# Patient Record
(demographics unavailable — no encounter records)

---

## 2025-01-27 NOTE — END OF VISIT
[] : Resident [FreeTextEntry3] : Agree with resident A/P. Reviewed perimenopause s/s, treatments. Using patient centered shared decision making and pap smear not indicated 2022 NILM, HPV neg, and no complaints deferred pelvic exam. Recent mammogram with biopsy therefore, breast exam deferred. Colonoscopy UTD. Would like STI testing.

## 2025-01-27 NOTE — HISTORY OF PRESENT ILLNESS
[Patient reported mammogram was normal] : Patient reported mammogram was normal [Patient reported PAP Smear was normal] : Patient reported PAP Smear was normal [Y] : Patient is sexually active [N] : Patient denies prior pregnancies [Normal Amount/Duration] :  normal amount and duration [Regular Cycle Intervals] : periods have been regular [Menarche Age: ____] : age at menarche was [unfilled] [Currently Active] : currently active [Both] : men and women [Vaginal] : vaginal [Oral] : oral [Mammogramdate] : 1/5/2025 [PapSmeardate] : 10/7/2022 [LMPDate] : 1/21/2025 [MensesFreq] : 28 [MensesLength] : 6-7 [FreeTextEntry1] : 1/21/2025

## 2025-01-27 NOTE — PHYSICAL EXAM
[Appropriately responsive] : appropriately responsive [Alert] : alert [No Acute Distress] : no acute distress [Oriented x3] : oriented x3 [FreeTextEntry2] : Shared physician patient decision making on not to perform pelvic exam at this time as pt has no vaginal complaints and not indicated to perfom pap smear today

## 2025-01-27 NOTE — COUNSELING
[Nutrition/ Exercise/ Weight Management] : nutrition, exercise, weight management [Vitamins/Supplements] : vitamins/supplements [Breast Self Exam] : breast self exam [STD (testing, results, tx)] : STD (testing, results, tx) [Lab Results] : lab results [Medication Management] : medication management

## 2025-01-27 NOTE — DISCUSSION/SUMMARY
[FreeTextEntry1] : 44yo G0 presents for annual exam  - Pt endorsing minimal perimenopausal symptoms that are not effecting her daily life at this time. Pt counseled on HRT and does not meet criteria for starting HRT at this time.  - Pt counseled on PAP smear with cotesting and no need for pap smear at this time - Pt requesting STI testing due to sexual activity with multiple partners. F/u results - Pt currently asymptomatic and had no itching, dysuria, or abnormal discharge. no need for vaginitis at this time due to hx of BV in past. Pt counseled and stated understanding - Prescribed  4mg zofran PRN for monthly nausea around menses - Pt endorsing NOE, given referral to Dr. Cramer for further managment seen and d/w Dr. Jones DC PGY4

## 2025-04-24 NOTE — PHYSICAL EXAM
[MA] : MA [FreeTextEntry2] : sammy [No Acute Distress] : in no acute distress [Well developed] : well developed [Well Nourished] : ~L well nourished [Good Hygeine] : demonstrates good hygeine [Oriented x3] : oriented to person, place, and time [Normal Memory] : ~T memory was ~L unimpaired [Normal Mood/Affect] : mood and affect are normal [Anxiety] : patient is not anxious [Normal Lung Sounds] : the lungs were clear to auscultation [Respirations regular] : ~T respiratory rate was regular [Cough] : no cough [Dyspnea] : no dyspnea [Rate & Rhythm Regular] : ~T heart rate and rhythm were normal [No Edema] : ~T edema was not present [Mass (___ Cm)] : no ~M [unfilled] abdominal mass was palpated [Tenderness] : ~T no ~M abdominal tenderness observed [H/Smegaly] : no hepatosplenomegaly [Hernia] : no hernia observed [Supraclavicular LAD] : no adenopathy noted in supraclavicular lymph nodes [Axillary LAD] : no adenopathy was noted in axillary nodes [Inguinal LAD] : no adenopathy was noted in the inguinal lymph nodes [Warm and Dry] : was warm and dry to touch [Turgor Normal] : skin turgor ~T was normal [Rash/Lesion] : no rash or lesion was noted [Normal Gait] : gait was normal [No Joint Swelling] : there was no joint swelling [No Clubbing, Cyanosis] : no clubbing or cyanosis of the fingernails [Normal Strength/Tone] : muscle strength and tone were normal [Normal Appearance] : general appearance was normal [Atrophy] : atrophy [Uterine Adnexae] : were not tender and not enlarged [Post Void Residual ____ml] : post void residual was [unfilled] ml [Normal rectal exam] : was normal [Normal] : was normal [None] : no [de-identified] : cst neg

## 2025-04-24 NOTE — ASSESSMENT
[FreeTextEntry1] : 46 y  G0\ salomón  with  sneeze and oab bothersome wears pads x 2 We discussed possible etiologies of her symptoms including overactive bladder. I recommend she start behavioral modifications and bladder training. Written instructions on how to perform bladder training were provided.  She will try this for 4-6 weeks. We reviewed medications for overactive bladder.  If she has no significant improvement in her symptoms she will try adding an anticholinergic medication. Risks and side effects reviewed extensively. She will RTO in 10-12 weeks for follow up or sooner if issues arise.  If she has no improvement in symptoms, will proceed with further workup including urodynamic testing and cystoscopy.  We reviewed management options for stress urinary incontinence including: observation, pelvic floor exercises, continence devices, periurethral bulking agents,  and surgical management. We discussed surgical management options and written information on stress urinary incontinence including management options from [facility] was provided to her and reviewed. We reviewed risks and benefits of each procedure. Specifically, we discussed the risks and benefits of mesh and reviewed the FDA notification on transvaginal mesh.

## 2025-04-24 NOTE — DISCUSSION/SUMMARY
[Reviewed Radiology Film/Image(s)] : Images from radiology studies were reviewed and examined. [Discuss Alternatives/Risks/Benefits w/Patient] : All alternatives, risks, and benefits were discussed with the patient/family and all questions were answered.  Patient expressed good understanding and appreciates the importance of follow up as recommended. [Visit Time ___ Minutes] : [unfilled] minutes [Face to Face Time___ Minutes] : with [unfilled] minutes in face to face consultation. [FreeTextEntry1] : rtc for URD

## 2025-04-24 NOTE — PROCEDURE
[Straight Catheterization] : insertion of a straight catheter [Stress Incontinence] : stress incontinence [None] : none [Clear] : clear [No Complications] : no complications [Tolerated Well] : the patient tolerated the procedure well [Post procedure instructions and information given] : Post procedure instructions and information were given and reviewed with patient.

## 2025-04-24 NOTE — OB HISTORY
[Normal Amount/Duration] : was of a normal amount and duration [Regular Cycle Intervals] : periods have been regular [Last Pap Smear ___] : date of last pap smear was on [unfilled] [Abnormal Pap Smear] : normal pap smear [Taking Estrogens] : is not taking estrogen replacement [Abnormal Bleeding] : without bleeding [Sexually Active] : sexually active [Very Satisfied] : very satisfied [FreeTextEntry1] : condoms

## 2025-04-24 NOTE — HISTORY OF PRESENT ILLNESS
[FreeTextEntry1] :    The patient is a46  year P0 with complaints of salomón She any urinary leakage She feels complete bladder emptying She voids every __ 9x ____ volume. 3nocturia.  Her liquid intake consists of water,seltzer, coffee occ wine She has a BM q She denies gross hematuria, hx of nephrolithiasis, vaginal bulge or recurrent UTIs Fecal Inc n Sexually active y Her last pap was constipaion The last time she has intercourse was Denies abdominal surgical history She has no history of bruising, excessive bleeding, peteciae.